# Patient Record
Sex: FEMALE | Race: WHITE | NOT HISPANIC OR LATINO | URBAN - METROPOLITAN AREA
[De-identification: names, ages, dates, MRNs, and addresses within clinical notes are randomized per-mention and may not be internally consistent; named-entity substitution may affect disease eponyms.]

---

## 2024-04-29 ENCOUNTER — OFFICE VISIT (OUTPATIENT)
Dept: FAMILY MEDICINE CLINIC | Facility: HOSPITAL | Age: 34
End: 2024-04-29
Payer: COMMERCIAL

## 2024-04-29 VITALS
SYSTOLIC BLOOD PRESSURE: 110 MMHG | DIASTOLIC BLOOD PRESSURE: 86 MMHG | OXYGEN SATURATION: 97 % | WEIGHT: 293 LBS | HEIGHT: 63 IN | HEART RATE: 81 BPM | BODY MASS INDEX: 51.91 KG/M2

## 2024-04-29 DIAGNOSIS — Z13.1 SCREENING FOR DIABETES MELLITUS: ICD-10-CM

## 2024-04-29 DIAGNOSIS — Z87.898 HISTORY OF WHEEZING: ICD-10-CM

## 2024-04-29 DIAGNOSIS — Z13.29 SCREENING FOR THYROID DISORDER: ICD-10-CM

## 2024-04-29 DIAGNOSIS — Z12.4 SCREENING FOR CERVICAL CANCER: ICD-10-CM

## 2024-04-29 DIAGNOSIS — F33.0 MILD EPISODE OF RECURRENT MAJOR DEPRESSIVE DISORDER (HCC): ICD-10-CM

## 2024-04-29 DIAGNOSIS — E66.01 CLASS 3 SEVERE OBESITY DUE TO EXCESS CALORIES WITHOUT SERIOUS COMORBIDITY WITH BODY MASS INDEX (BMI) OF 60.0 TO 69.9 IN ADULT (HCC): ICD-10-CM

## 2024-04-29 DIAGNOSIS — N83.201 CYST OF RIGHT OVARY: ICD-10-CM

## 2024-04-29 DIAGNOSIS — Z83.49 FAMILY HISTORY OF HYPOTHYROIDISM: ICD-10-CM

## 2024-04-29 DIAGNOSIS — N83.519: ICD-10-CM

## 2024-04-29 DIAGNOSIS — Z13.0 SCREENING FOR IRON DEFICIENCY ANEMIA: Primary | ICD-10-CM

## 2024-04-29 PROCEDURE — 99204 OFFICE O/P NEW MOD 45 MIN: CPT | Performed by: STUDENT IN AN ORGANIZED HEALTH CARE EDUCATION/TRAINING PROGRAM

## 2024-04-29 PROCEDURE — 3725F SCREEN DEPRESSION PERFORMED: CPT | Performed by: STUDENT IN AN ORGANIZED HEALTH CARE EDUCATION/TRAINING PROGRAM

## 2024-04-29 RX ORDER — ALBUTEROL SULFATE 90 UG/1
2 AEROSOL, METERED RESPIRATORY (INHALATION) EVERY 6 HOURS PRN
Qty: 18 G | Refills: 0 | Status: SHIPPED | OUTPATIENT
Start: 2024-04-29

## 2024-04-29 RX ORDER — BUPROPION HYDROCHLORIDE 300 MG/1
300 TABLET ORAL EVERY MORNING
COMMUNITY
Start: 2023-02-28 | End: 2024-04-29 | Stop reason: SDUPTHER

## 2024-04-29 RX ORDER — BUPROPION HYDROCHLORIDE 300 MG/1
300 TABLET ORAL EVERY MORNING
Qty: 90 TABLET | Refills: 0 | Status: SHIPPED | OUTPATIENT
Start: 2024-04-29

## 2024-04-29 NOTE — PATIENT INSTRUCTIONS
Jalil Jimenez     [employer] - HR Tab    Employee Health - get vaccine records.     [facility] order

## 2024-04-29 NOTE — PROGRESS NOTES
[facility]   Rachna Alexander DO    Assessment/Plan:      Diagnosis ICD-10-CM Associated Orders   1. Screening for iron deficiency anemia  Z13.0 CBC and differential     CBC and differential      2. Screening for diabetes mellitus  Z13.1 Comprehensive metabolic panel     Comprehensive metabolic panel      3. Screening for thyroid disorder  Z13.29 TSH, 3rd generation     T4, free     TSH, 3rd generation     T4, free      4. Family history of hypothyroidism  Z83.49 TSH, 3rd generation     T4, free     TSH, 3rd generation     T4, free      5. Cyst of right ovary  N83.201 Ambulatory Referral to Obstetrics / Gynecology      6. Screening for cervical cancer  Z12.4 Ambulatory Referral to Obstetrics / Gynecology      7. Ovarian torsion, acquired  N83.519 Ambulatory Referral to Obstetrics / Gynecology      8. Class 3 severe obesity due to excess calories without serious comorbidity with body mass index (BMI) of 60.0 to 69.9 in adult (HCC)  E66.01 CBC and differential    Z68.44 Comprehensive metabolic panel     Cortisol Level, AM Specimen     CBC and differential     Comprehensive metabolic panel     Cortisol Level, AM Specimen      9. History of wheezing  Z87.898 albuterol (Ventolin HFA) 90 mcg/act inhaler      10. Mild episode of recurrent major depressive disorder (HCC)  F33.0 buPROPion (WELLBUTRIN XL) 300 mg 24 hr tablet        Complete BW this yr.   Patient's medications were reviewed and reconciled.  Ordered/Re-ordered as above.  Estab with GYN locally.   Return in about 3 months (around 7/29/2024) for Annual Physical.  Patient may call or return to office with any questions or concerns.   ______________________________________________________________________  Subjective:     Patient ID: Julia Noonan is a 33 y.o. female.  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   Was traveling nursing.    Was at [facility] until 2014.     Has not had labs recently.     Both parents on synthroid, family obesity.     No prior abnml Paps. Dr. Faiza Strauss of ovarian torsion, surgery 2014, had ovarian cysts, found to only have R ovary.   Was on OCP - sprintec to help with cysts. Self stopped in 2017.   GF - 9 months.     Always heavy, used to have  5d a week, only time helping weight.     Sees therapist, on wellbutrin, and has seen nutritionist & done weight watchers.   Was doing forher on wellbutrin since Feb 2023.     In the past was on lexapro & effexor, self stopped.     [employer] Class 2010.   On license of [facility].      The following portions of the patient's history were reviewed and updated as appropriate: allergies, current medications, past medical history, and problem list.    Review of Systems   Constitutional:  Negative for chills and fever.   Respiratory:  Positive for wheezing (now starting after ice hockey & lasting longer - no asthma hx as a kid). Negative for cough and shortness of breath.    Cardiovascular:  Negative for chest pain and palpitations.   Neurological:  Negative for dizziness, light-headedness and headaches.       Objective:      Vitals:    04/29/24 1204   BP: 110/86   Pulse: 81   SpO2: 97%      Physical Exam  Vitals and nursing note reviewed.   Constitutional:       General: She is not in acute distress.     Appearance: Normal appearance. She is obese. She is not ill-appearing.   HENT:      Head: Normocephalic and atraumatic.   Eyes:      General: No scleral icterus.        Right eye: No discharge.         Left eye: No discharge.   Cardiovascular:      Rate and Rhythm: Normal rate and regular rhythm.      Pulses: Normal pulses.      Heart sounds: Normal heart sounds. No murmur heard.  Pulmonary:      Effort: Pulmonary effort is normal. No respiratory distress.      Breath sounds: Normal breath sounds. No stridor. No wheezing.   Musculoskeletal:      Cervical back: Normal range  "of motion and neck supple. No rigidity.      Right lower leg: No edema.      Left lower leg: No edema.   Neurological:      Mental Status: She is alert and oriented to person, place, and time.      Gait: Gait normal.   Psychiatric:         Mood and Affect: Mood normal.         Behavior: Behavior normal.         Thought Content: Thought content normal.         Judgment: Judgment normal.         Portions of the record may have been created with voice recognition software. Occasional wrong word or \"sound alike\" substitutions may have occurred due to the inherent limitations of voice recognition software. Please review the chart carefully and recognize, using context, where substitutions/typographical errors may have occurred.     "

## 2024-05-14 LAB
ALBUMIN SERPL-MCNC: 4.2 G/DL (ref 3.9–4.9)
ALBUMIN/GLOB SERPL: 1.7 {RATIO} (ref 1.2–2.2)
ALP SERPL-CCNC: 99 IU/L (ref 44–121)
ALT SERPL-CCNC: 15 IU/L (ref 0–32)
AST SERPL-CCNC: 15 IU/L (ref 0–40)
BASOPHILS # BLD AUTO: 0.1 X10E3/UL (ref 0–0.2)
BASOPHILS NFR BLD AUTO: 1 %
BILIRUB SERPL-MCNC: 0.4 MG/DL (ref 0–1.2)
BUN SERPL-MCNC: 11 MG/DL (ref 6–20)
BUN/CREAT SERPL: 14 (ref 9–23)
CALCIUM SERPL-MCNC: 9.1 MG/DL (ref 8.7–10.2)
CHLORIDE SERPL-SCNC: 101 MMOL/L (ref 96–106)
CO2 SERPL-SCNC: 21 MMOL/L (ref 20–29)
CORTIS AM PEAK SERPL-MCNC: 13.8 UG/DL (ref 6.2–19.4)
CREAT SERPL-MCNC: 0.76 MG/DL (ref 0.57–1)
EGFR: 107 ML/MIN/1.73
EOSINOPHIL # BLD AUTO: 0.3 X10E3/UL (ref 0–0.4)
EOSINOPHIL NFR BLD AUTO: 3 %
ERYTHROCYTE [DISTWIDTH] IN BLOOD BY AUTOMATED COUNT: 12.5 % (ref 11.7–15.4)
GLOBULIN SER-MCNC: 2.5 G/DL (ref 1.5–4.5)
GLUCOSE SERPL-MCNC: 95 MG/DL (ref 70–99)
HCT VFR BLD AUTO: 38.4 % (ref 34–46.6)
HGB BLD-MCNC: 12.5 G/DL (ref 11.1–15.9)
IMM GRANULOCYTES # BLD: 0 X10E3/UL (ref 0–0.1)
IMM GRANULOCYTES NFR BLD: 0 %
LYMPHOCYTES # BLD AUTO: 1.9 X10E3/UL (ref 0.7–3.1)
LYMPHOCYTES NFR BLD AUTO: 22 %
MCH RBC QN AUTO: 29.1 PG (ref 26.6–33)
MCHC RBC AUTO-ENTMCNC: 32.6 G/DL (ref 31.5–35.7)
MCV RBC AUTO: 90 FL (ref 79–97)
MONOCYTES # BLD AUTO: 0.6 X10E3/UL (ref 0.1–0.9)
MONOCYTES NFR BLD AUTO: 7 %
NEUTROPHILS # BLD AUTO: 5.9 X10E3/UL (ref 1.4–7)
NEUTROPHILS NFR BLD AUTO: 67 %
PLATELET # BLD AUTO: 345 X10E3/UL (ref 150–450)
POTASSIUM SERPL-SCNC: 4.2 MMOL/L (ref 3.5–5.2)
PROT SERPL-MCNC: 6.7 G/DL (ref 6–8.5)
RBC # BLD AUTO: 4.29 X10E6/UL (ref 3.77–5.28)
SODIUM SERPL-SCNC: 137 MMOL/L (ref 134–144)
T4 FREE SERPL-MCNC: 1.13 NG/DL (ref 0.82–1.77)
TSH SERPL DL<=0.005 MIU/L-ACNC: 3.35 UIU/ML (ref 0.45–4.5)
WBC # BLD AUTO: 8.8 X10E3/UL (ref 3.4–10.8)

## 2024-05-21 DIAGNOSIS — Z87.898 HISTORY OF WHEEZING: ICD-10-CM

## 2024-05-21 RX ORDER — ALBUTEROL SULFATE 90 UG/1
AEROSOL, METERED RESPIRATORY (INHALATION)
Qty: 18 G | Refills: 5 | Status: SHIPPED | OUTPATIENT
Start: 2024-05-21

## 2024-08-02 ENCOUNTER — PATIENT MESSAGE (OUTPATIENT)
Dept: FAMILY MEDICINE CLINIC | Facility: HOSPITAL | Age: 34
End: 2024-08-02

## 2024-08-08 DIAGNOSIS — Z13.220 SCREENING FOR CHOLESTEROL LEVEL: Primary | ICD-10-CM

## 2024-08-08 DIAGNOSIS — Z13.1 SCREENING FOR DIABETES MELLITUS: ICD-10-CM

## 2024-08-12 ENCOUNTER — OFFICE VISIT (OUTPATIENT)
Dept: FAMILY MEDICINE CLINIC | Facility: HOSPITAL | Age: 34
End: 2024-08-12
Payer: COMMERCIAL

## 2024-08-12 ENCOUNTER — TELEPHONE (OUTPATIENT)
Age: 34
End: 2024-08-12

## 2024-08-12 VITALS
BODY MASS INDEX: 51.91 KG/M2 | OXYGEN SATURATION: 97 % | HEART RATE: 98 BPM | HEIGHT: 63 IN | DIASTOLIC BLOOD PRESSURE: 84 MMHG | WEIGHT: 293 LBS | SYSTOLIC BLOOD PRESSURE: 116 MMHG

## 2024-08-12 DIAGNOSIS — Z00.00 ROUTINE ADULT HEALTH MAINTENANCE: Primary | ICD-10-CM

## 2024-08-12 DIAGNOSIS — E66.01 CLASS 3 SEVERE OBESITY DUE TO EXCESS CALORIES WITHOUT SERIOUS COMORBIDITY WITH BODY MASS INDEX (BMI) OF 60.0 TO 69.9 IN ADULT (HCC): ICD-10-CM

## 2024-08-12 DIAGNOSIS — F33.0 MILD EPISODE OF RECURRENT MAJOR DEPRESSIVE DISORDER (HCC): ICD-10-CM

## 2024-08-12 DIAGNOSIS — Z83.49 FAMILY HISTORY OF OBESITY: ICD-10-CM

## 2024-08-12 DIAGNOSIS — Z90.721 OVARY ABSENT: ICD-10-CM

## 2024-08-12 DIAGNOSIS — N83.53: ICD-10-CM

## 2024-08-12 DIAGNOSIS — N83.519: ICD-10-CM

## 2024-08-12 DIAGNOSIS — Z83.49 FAMILY HISTORY OF HYPOTHYROIDISM: ICD-10-CM

## 2024-08-12 PROCEDURE — 99214 OFFICE O/P EST MOD 30 MIN: CPT | Performed by: STUDENT IN AN ORGANIZED HEALTH CARE EDUCATION/TRAINING PROGRAM

## 2024-08-12 PROCEDURE — 99395 PREV VISIT EST AGE 18-39: CPT | Performed by: STUDENT IN AN ORGANIZED HEALTH CARE EDUCATION/TRAINING PROGRAM

## 2024-08-12 RX ORDER — SEMAGLUTIDE 0.25 MG/.5ML
INJECTION, SOLUTION SUBCUTANEOUS
Qty: 2 ML | Refills: 0 | Status: SHIPPED | OUTPATIENT
Start: 2024-08-12

## 2024-08-12 RX ORDER — BUPROPION HYDROCHLORIDE 300 MG/1
300 TABLET ORAL EVERY MORNING
Qty: 90 TABLET | Refills: 3 | Status: SHIPPED | OUTPATIENT
Start: 2024-08-12

## 2024-08-12 NOTE — TELEPHONE ENCOUNTER
PA for Wegovy 0.25 MG/0.5ML SUBMITTED     via    []CMM-KEY   [x][employer]-Case ID # [id number]   []Faxed to plan   []Other website   []Phone call Case ID #     Office notes sent, clinical questions answered. Awaiting determination    Turnaround time for your insurance to make a decision on your Prior Authorization can take 7-21 business days.

## 2024-08-12 NOTE — PROGRESS NOTES
Adult Annual Physical  Name: Julia Noonan      : 1990      MRN: [id number]  Encounter Provider: Rachna Alexander DO  Encounter Date: 2024   Encounter department: [facility] [address]    Assessment & Plan   1. Routine adult health maintenance  2. Torsion of ovary, ovarian pedicle, or fallopian tube  3. Class 3 severe obesity due to excess calories without serious comorbidity with body mass index (BMI) of 60.0 to 69.9 in adult (HCC)  -     Semaglutide-Weight Management (Wegovy) 0.25 MG/0.5ML; Inject 0.25 mg under the skin weekly  4. Family history of hypothyroidism  5. Family history of obesity  -     Semaglutide-Weight Management (Wegovy) 0.25 MG/0.5ML; Inject 0.25 mg under the skin weekly  6. Ovarian torsion, acquired  7. Mild episode of recurrent major depressive disorder (HCC)  -     buPROPion (WELLBUTRIN XL) 300 mg 24 hr tablet; Take 1 tablet (300 mg total) by mouth every morning  8. Ovary absent    Has been on wellbutrin, no weight loss noted.   Consider phentermine if denied by insurance.   Start with wegovy PA for obesity & years of weight gain.   No hx of pancreatitis & no hx of thyroid cancer in pt or family.     Getting new TDAP Monday at Pre-Employment physical.   Immunizations and preventive care screenings were discussed with patient today. Appropriate education was printed on patient's after visit summary.    Counseling:  Alcohol/drug use: discussed moderation in alcohol intake, the recommendations for healthy alcohol use, and avoidance of illicit drug use.  Dental Health: discussed importance of regular tooth brushing, flossing, and dental visits.  Injury prevention: discussed safety/seat belts, safety helmets, smoke detectors, carbon dioxide detectors, and smoking near bedding or upholstery.  Sexual health: discussed sexually transmitted diseases, partner selection, use of condoms, avoidance of unintended pregnancy, and contraceptive alternatives.  Exercise: the  importance of regular exercise/physical activity was discussed. Recommend exercise 3-5 times per week for at least 30 minutes.     Return in about 3 months (around 11/12/2024) for F/U Chronic DX, Med Check.         History of Present Illness     Adult Annual Physical:  Patient presents for annual physical. Did just get labs done   A1c 5.1  Total Chol 171, , HDL 34, LDL 99   Has been very heavy her whole life.   Never on prior weight loss meds. Her whole family is obese also.     Would like to pursue TOPS surgery & will need lower BMI eventually.   Exercises, plays hockey, bikes, but can't lose more than 10 lbs.   Labs all normal in May also for cortisol & TSH/T4..     Diet and Physical Activity:  - Diet/Nutrition:. More home meals, less coffee, will need to up her water,  - Exercise: 3-4 times a week on average. biking & hockey    General Health:  - Sleep: sleeps well and 4-6 hours of sleep on average. 10p-5a  - Hearing: normal hearing bilateral ears.  - Vision: wears contacts and goes for regular eye exams. R eye only  - Dental: brushes teeth twice daily and regular dental visits.    /GYN Health:  - Follows with GYN: no.   - Last menstrual cycle: 7/22/2024.   - History of STDs: no  - Contraception:. Female only partner      Wt Readings from Last 3 Encounters:   08/12/24 (!) 155 kg (342 lb)   04/29/24 (!) 157 kg (347 lb)     Temp Readings from Last 3 Encounters:   No data found for Temp     BP Readings from Last 3 Encounters:   08/12/24 116/84   04/29/24 110/86     Pulse Readings from Last 3 Encounters:   08/12/24 98   04/29/24 81     Review of Systems   Constitutional:  Negative for chills and fever.   Eyes:  Positive for visual disturbance (R eye astigmatism).   Respiratory:  Negative for cough and shortness of breath.    Cardiovascular:  Positive for leg swelling (rare on hot days). Negative for chest pain and palpitations.   Gastrointestinal:  Negative for constipation and diarrhea.   Genitourinary:   "Negative for dysuria and frequency.   Neurological:  Negative for dizziness, light-headedness and headaches.   Psychiatric/Behavioral:  Negative for self-injury, sleep disturbance and suicidal ideas.      Current Outpatient Medications on File Prior to Visit   Medication Sig Dispense Refill   • albuterol (PROVENTIL HFA,VENTOLIN HFA) 90 mcg/act inhaler INHALE 2 PUFFS BY MOUTH EVERY 6 HOURS AS NEEDED FOR WHEEZE OR FOR SHORTNESS OF BREATH 18 g 5   • [DISCONTINUED] buPROPion (WELLBUTRIN XL) 300 mg 24 hr tablet Take 1 tablet (300 mg total) by mouth every morning 90 tablet 0     No current facility-administered medications on file prior to visit.      Social History     Tobacco Use   • Smoking status: Never   • Smokeless tobacco: Never   Vaping Use   • Vaping status: Never Used   Substance and Sexual Activity   • Alcohol use: Not Currently     Alcohol/week: 1.0 standard drink of alcohol     Types: 1 Cans of beer per week     Comment: Socially   • Drug use: Never   • Sexual activity: Yes     Partners: Female     Objective     /84   Pulse 98   Ht 5' 3\" (1.6 m)   Wt (!) 155 kg (342 lb)   SpO2 97%   BMI 60.58 kg/m²     Physical Exam  Vitals reviewed.   Constitutional:       General: She is awake. She is not in acute distress.     Appearance: Normal appearance. She is well-developed. She is morbidly obese. She is not ill-appearing.   HENT:      Head: Normocephalic and atraumatic.      Right Ear: Tympanic membrane, ear canal and external ear normal. There is no impacted cerumen.      Left Ear: Tympanic membrane, ear canal and external ear normal. There is no impacted cerumen.      Nose: Nose normal. No congestion or rhinorrhea.      Mouth/Throat:      Mouth: Mucous membranes are moist.      Pharynx: Oropharynx is clear. No oropharyngeal exudate or posterior oropharyngeal erythema.   Eyes:      General: No scleral icterus.        Right eye: No discharge.         Left eye: No discharge.      Conjunctiva/sclera: " Conjunctivae normal.   Neck:      Comments: No thyroid nodules or thyromegaly.  Cardiovascular:      Rate and Rhythm: Normal rate and regular rhythm.      Pulses: Normal pulses.      Heart sounds: Normal heart sounds. No murmur heard.     No friction rub. No gallop.   Pulmonary:      Effort: Pulmonary effort is normal. No respiratory distress.      Breath sounds: Normal breath sounds. No stridor. No wheezing.   Musculoskeletal:         General: Normal range of motion.      Cervical back: Normal range of motion and neck supple. No rigidity.      Right lower leg: No edema.      Left lower leg: No edema.   Lymphadenopathy:      Cervical: No cervical adenopathy.   Skin:     General: Skin is warm and dry.      Capillary Refill: Capillary refill takes less than 2 seconds.      Coloration: Skin is not jaundiced or pale.   Neurological:      Mental Status: She is alert and oriented to person, place, and time.      Gait: Gait normal.   Psychiatric:         Mood and Affect: Mood normal.         Behavior: Behavior normal. Behavior is cooperative.         Thought Content: Thought content normal.         Judgment: Judgment normal.

## 2024-08-14 NOTE — TELEPHONE ENCOUNTER
PA for Wegovy 0.25mg Approved     Date(s) approved August 12, 2024 to March 12, 2025     Case #: [id number]     Patient advised by          [x] [email or web address] Message  [] Phone call   []LMOM  []L/M to call office as no active Communication consent on file  []Unable to leave detailed message as VM not approved on Communication consent       Pharmacy advised by    [x]Fax  []Phone call    Approval letter scanned into Media No

## 2024-08-15 DIAGNOSIS — E66.01 CLASS 3 SEVERE OBESITY DUE TO EXCESS CALORIES WITHOUT SERIOUS COMORBIDITY WITH BODY MASS INDEX (BMI) OF 60.0 TO 69.9 IN ADULT (HCC): ICD-10-CM

## 2024-08-15 DIAGNOSIS — Z83.49 FAMILY HISTORY OF OBESITY: ICD-10-CM

## 2024-09-13 RX ORDER — SEMAGLUTIDE 0.25 MG/.5ML
INJECTION, SOLUTION SUBCUTANEOUS
Qty: 2 ML | Refills: 0 | Status: SHIPPED | OUTPATIENT
Start: 2024-09-13 | End: 2024-09-16

## 2024-09-16 RX ORDER — SEMAGLUTIDE 0.25 MG/.5ML
INJECTION, SOLUTION SUBCUTANEOUS
Qty: 2 ML | Refills: 0 | Status: SHIPPED | OUTPATIENT
Start: 2024-09-16

## 2024-10-07 DIAGNOSIS — E66.01 CLASS 3 SEVERE OBESITY DUE TO EXCESS CALORIES WITHOUT SERIOUS COMORBIDITY WITH BODY MASS INDEX (BMI) OF 60.0 TO 69.9 IN ADULT (HCC): ICD-10-CM

## 2024-10-07 DIAGNOSIS — E66.813 CLASS 3 SEVERE OBESITY DUE TO EXCESS CALORIES WITHOUT SERIOUS COMORBIDITY WITH BODY MASS INDEX (BMI) OF 60.0 TO 69.9 IN ADULT (HCC): ICD-10-CM

## 2024-10-07 DIAGNOSIS — Z83.49 FAMILY HISTORY OF OBESITY: ICD-10-CM

## 2024-10-08 RX ORDER — SEMAGLUTIDE 0.25 MG/.5ML
INJECTION, SOLUTION SUBCUTANEOUS
Qty: 2 ML | Refills: 0 | Status: SHIPPED | OUTPATIENT
Start: 2024-10-08 | End: 2024-10-10

## 2024-10-10 DIAGNOSIS — E66.01 CLASS 3 SEVERE OBESITY DUE TO EXCESS CALORIES WITHOUT SERIOUS COMORBIDITY WITH BODY MASS INDEX (BMI) OF 60.0 TO 69.9 IN ADULT (HCC): Primary | ICD-10-CM

## 2024-10-10 DIAGNOSIS — Z83.49 FAMILY HISTORY OF OBESITY: ICD-10-CM

## 2024-10-10 DIAGNOSIS — E66.813 CLASS 3 SEVERE OBESITY DUE TO EXCESS CALORIES WITHOUT SERIOUS COMORBIDITY WITH BODY MASS INDEX (BMI) OF 60.0 TO 69.9 IN ADULT (HCC): Primary | ICD-10-CM

## 2024-10-10 RX ORDER — SEMAGLUTIDE 0.5 MG/.5ML
INJECTION, SOLUTION SUBCUTANEOUS
Qty: 2 ML | Refills: 0 | Status: SHIPPED | OUTPATIENT
Start: 2024-10-10

## 2024-10-16 ENCOUNTER — TELEPHONE (OUTPATIENT)
Age: 34
End: 2024-10-16

## 2024-10-16 DIAGNOSIS — E66.813 CLASS 3 SEVERE OBESITY DUE TO EXCESS CALORIES WITHOUT SERIOUS COMORBIDITY WITH BODY MASS INDEX (BMI) OF 60.0 TO 69.9 IN ADULT (HCC): Primary | ICD-10-CM

## 2024-10-16 DIAGNOSIS — E66.01 CLASS 3 SEVERE OBESITY DUE TO EXCESS CALORIES WITHOUT SERIOUS COMORBIDITY WITH BODY MASS INDEX (BMI) OF 60.0 TO 69.9 IN ADULT (HCC): Primary | ICD-10-CM

## 2024-10-16 NOTE — TELEPHONE ENCOUNTER
Patient calling regarding Rx: Wegovy 0.5mg. Pt states medication not available to fill at any pharmacies or 0.25mg inquiring if able to go up a dose or requesting further recommendations.      Please review and advise.  Thank you

## 2024-10-22 RX ORDER — SEMAGLUTIDE 0.25 MG/.5ML
INJECTION, SOLUTION SUBCUTANEOUS
Qty: 2 ML | Refills: 0 | OUTPATIENT
Start: 2024-10-22

## 2024-11-13 ENCOUNTER — OFFICE VISIT (OUTPATIENT)
Dept: FAMILY MEDICINE CLINIC | Facility: HOSPITAL | Age: 34
End: 2024-11-13
Payer: COMMERCIAL

## 2024-11-13 VITALS
HEIGHT: 63 IN | BODY MASS INDEX: 60.58 KG/M2 | SYSTOLIC BLOOD PRESSURE: 124 MMHG | OXYGEN SATURATION: 98 % | HEART RATE: 80 BPM | RESPIRATION RATE: 16 BRPM | DIASTOLIC BLOOD PRESSURE: 82 MMHG

## 2024-11-13 DIAGNOSIS — E66.01 MORBID OBESITY WITH BMI OF 60.0-69.9, ADULT (HCC): ICD-10-CM

## 2024-11-13 DIAGNOSIS — F33.0 MILD EPISODE OF RECURRENT MAJOR DEPRESSIVE DISORDER (HCC): ICD-10-CM

## 2024-11-13 DIAGNOSIS — E66.01 MORBID OBESITY WITH BMI OF 60.0-69.9, ADULT (HCC): Primary | ICD-10-CM

## 2024-11-13 PROCEDURE — 99213 OFFICE O/P EST LOW 20 MIN: CPT | Performed by: NURSE PRACTITIONER

## 2024-11-13 RX ORDER — TIRZEPATIDE 2.5 MG/.5ML
2.5 INJECTION, SOLUTION SUBCUTANEOUS WEEKLY
Qty: 2 ML | Refills: 0 | Status: SHIPPED | OUTPATIENT
Start: 2024-11-13

## 2024-11-13 RX ORDER — TIRZEPATIDE 2.5 MG/.5ML
2.5 INJECTION, SOLUTION SUBCUTANEOUS WEEKLY
Qty: 2 ML | Refills: 0 | Status: SHIPPED | OUTPATIENT
Start: 2024-11-13 | End: 2024-11-13 | Stop reason: SDUPTHER

## 2024-11-13 RX ORDER — TIRZEPATIDE 2.5 MG/.5ML
2.5 INJECTION, SOLUTION SUBCUTANEOUS WEEKLY
Qty: 2 ML | Refills: 0 | Status: SHIPPED | OUTPATIENT
Start: 2024-11-13 | End: 2024-11-13 | Stop reason: ALTCHOICE

## 2024-11-13 RX ORDER — BUPROPION HYDROCHLORIDE 300 MG/1
300 TABLET ORAL EVERY MORNING
Qty: 90 TABLET | Refills: 1 | Status: SHIPPED | OUTPATIENT
Start: 2024-11-13

## 2024-11-13 NOTE — ASSESSMENT & PLAN NOTE
Body mass index is 60.58 kg/m².  Reports she has been overweight her entire life and has strong family history of obesity.  Denies personal or family history of thyroid cancer or pancreatitis.  No chance of pregnancy as she has female partner only.  She remains physically active with biking and ice hockey and optimizing diet without success.  Electrolytes thyroid function as well as a.m. cortisol within normal limits in May 2024.  Agreeable to starting low-dose Mounjaro as well as weight management consult.  Discussed side effects and warning signs with verbalized understanding.      Prior Authorization Clinical Questions for Weight Management Pharmacotherapy    1. Does the patient have a contrainidcation to medication prescribed for weight management?: No  2. Does the patient have a diagnosis of obesity, confirmed by a BMI greater than or equal to 30 kg/m^2?: Yes  3. Does the patient have a BMI of greater than or equal to 27 kg/m^2 with at least one weight-related comorbidity/risk factor/complication (e.g. diabetes, dyslipidemia, coronary artery disease)?: Yes  5. Has the patient been on a weight loss regimen of low-calorie diet, increased physical activity, and lifestyle modifications for a minimum of 6 months?: Yes  6. Has the patient completed a comprehensive weight loss program (ie, Weight Watchers, Noom, Bariatrics, other paddy on phone)? If so, what?: Yes  7. Does the patient have a history of type 2 diabetes?: No  8. Has the member tried and failed other weight loss medication within the past 12 months?: No  9. Will the member use requested medication in combination with another GLP agonist or weight loss drug?: No  10. Is the medication a controlled substance?: No     Baseline weight (in pounds): 342 lbs

## 2024-11-13 NOTE — PROGRESS NOTES
[facility]   Larisa COLLINS    Assessment/Plan:   1. Morbid obesity with BMI of 60.0-69.9, adult ([facility])  Assessment & Plan:  Body mass index is 60.58 kg/m².  Reports she has been overweight her entire life and has strong family history of obesity.  Denies personal or family history of thyroid cancer or pancreatitis.  She remains physically active with biking and ice hockey and optimizing diet without success.  Electrolytes thyroid function as well as a.m. cortisol within normal limits in May 2024.  Agreeable to starting low-dose Mounjaro as well as weight management consult.  Discussed side effects and warning signs with verbalized understanding.      Prior Authorization Clinical Questions for Weight Management Pharmacotherapy    1. Does the patient have a contrainidcation to medication prescribed for weight management?: No  2. Does the patient have a diagnosis of obesity, confirmed by a BMI greater than or equal to 30 kg/m^2?: Yes  3. Does the patient have a BMI of greater than or equal to 27 kg/m^2 with at least one weight-related comorbidity/risk factor/complication (e.g. diabetes, dyslipidemia, coronary artery disease)?: Yes  5. Has the patient been on a weight loss regimen of low-calorie diet, increased physical activity, and lifestyle modifications for a minimum of 6 months?: Yes  6. Has the patient completed a comprehensive weight loss program (ie, Weight Watchers, Noom, Bariatrics, other paddy on phone)? If so, what?: Yes  7. Does the patient have a history of type 2 diabetes?: No  8. Has the member tried and failed other weight loss medication within the past 12 months?: No  9. Will the member use requested medication in combination with another GLP agonist or weight loss drug?: No  10. Is the medication a controlled substance?: No     Baseline weight (in pounds): 342 lbs       Orders:  -     Ambulatory Referral to Weight Management; Future  -     Tirzepatide (Mounjaro) 2.5 MG/0.5ML SOAJ;  Inject 2.5 mg under the skin once a week      No follow-ups on file.  Patient may call or return to office with any questions or concerns.     ______________________________________________________________________  Subjective:     Patient ID: Julia Noonan is a 34 y.o. female.  Julia Noonan  Chief Complaint   Patient presents with    Follow-up     Wegovy follow up not currently using        Was started on wegovy and took it for a month.  Struggled to get refill due to supply thus stopped.  Thought it interacted with her Wellbutrin but would like to try alternative. Continues to be physically active and optimizes diet without weight loss.                  The following portions of the patient's history were reviewed and updated as appropriate: allergies, current medications, past family history, past medical history, past social history, past surgical history, and problem list.    Review of Systems   Constitutional: Negative.  Negative for activity change, appetite change, chills, fatigue, fever and unexpected weight change.   HENT: Negative.  Negative for congestion, ear pain, postnasal drip and sinus pain.    Eyes: Negative.    Respiratory: Negative.  Negative for cough, chest tightness, shortness of breath and wheezing.    Cardiovascular: Negative.  Negative for chest pain, palpitations and leg swelling.   Gastrointestinal: Negative.  Negative for abdominal pain, constipation and diarrhea.   Endocrine: Negative.    Genitourinary: Negative.  Negative for difficulty urinating, dysuria and menstrual problem.   Musculoskeletal: Negative.  Negative for gait problem.   Skin: Negative.    Allergic/Immunologic: Negative.  Negative for immunocompromised state.   Neurological: Negative.  Negative for dizziness and light-headedness.   Hematological: Negative.    Psychiatric/Behavioral: Negative.  Negative for sleep disturbance.          Objective:      Vitals:    11/13/24 0730   BP: 124/82   Pulse: 80   Resp: 16   SpO2:  "98%      Physical Exam  Vitals and nursing note reviewed.   Constitutional:       General: She is awake.      Appearance: Normal appearance. She is morbidly obese.   HENT:      Head: Normocephalic and atraumatic.      Right Ear: Tympanic membrane, ear canal and external ear normal.      Left Ear: Tympanic membrane, ear canal and external ear normal.      Nose: Nose normal.      Mouth/Throat:      Mouth: Mucous membranes are moist.      Pharynx: Oropharynx is clear.   Eyes:      Extraocular Movements: Extraocular movements intact.      Conjunctiva/sclera: Conjunctivae normal.      Pupils: Pupils are equal, round, and reactive to light.   Cardiovascular:      Rate and Rhythm: Normal rate and regular rhythm.      Pulses: Normal pulses.      Heart sounds: Normal heart sounds.   Pulmonary:      Effort: Pulmonary effort is normal.      Breath sounds: Normal breath sounds.   Abdominal:      General: Bowel sounds are normal.      Palpations: Abdomen is soft.   Musculoskeletal:         General: Normal range of motion.      Cervical back: Normal range of motion and neck supple.      Right lower leg: No edema.      Left lower leg: No edema.      Comments: Compression stockings in place.    Skin:     General: Skin is warm and dry.   Neurological:      General: No focal deficit present.      Mental Status: She is alert and oriented to person, place, and time.   Psychiatric:         Mood and Affect: Mood normal.         Behavior: Behavior normal. Behavior is cooperative.         Thought Content: Thought content normal.         Judgment: Judgment normal.           Portions of the record may have been created with voice recognition software. Occasional wrong word or \"sound alike\" substitutions may have occurred due to the inherent limitations of voice recognition software. Please review the chart carefully and recognize, using context, where substitutions/typographical errors may have occurred.       "

## 2024-11-13 NOTE — TELEPHONE ENCOUNTER
Transmission to pharmacy error occurred. Medication resent to pharmacy.     Receipt confirmed by pharmacy.   E-Prescribing Status: Receipt confirmed by pharmacy (11/13/2024  9:27 AM EST)

## 2024-11-29 NOTE — PROGRESS NOTES
"Weight Management Medical Nutrition Assessment  Julia is here for medical meal planning. Current wt: 334.8 lbs. Mounjaro ordered last month by her PCP however is not diabetic so that was not approved. Had been on wegovy but had trouble obtaining and feels it interacted with wellbutrin. Reports she has been overweight her whole life. Was on her first diet in 3rd grade. She is currently on wellbutrin. Recommend visit with provider to determine if additional medications would be appropriate. Food recall reveals inadequate intake and therefore decreased protein intake. Benefits of interval eating reviewed. Hydration inadequate with large caffeine consumption. Reports she has been working on decreasing caffeine. Discussed fluid needs. Meal plan and other resources provided. Options for f/u discussed. She will f/u in 7 wks.     Patient seen by Medical Provider in past 6 months:  no  Requested to schedule appointment with Medical Provider: Yes    Anthropometric Measurements  Start Weight (#) & Date: 334.8 lbs 12/6/24  Current Weight (#): 334.8 lbs   TBW % Change from start weight:-  Ideal Body Weight (#):125 lbs 65\" (BMI 25 150.1 lbs )    Goal Weight (#):no specific   Highest: 350 lbs (18 months ago)  Lowest: 315 lbs    Weight Loss History  Previous weight loss attempts: Commercial Programs (Weight Watchers, Gita Buchanan, etc.)  Exercise  Fasting  Self Created Diets (Portion Control, Healthy Food Choices, etc.)  wegovy    Food and Nutrition Related History  Wake up: 5:55 a.m.   Bed Time:9:30-10    Food Recall  Breakfast:6:30: egg muffins (3  total eggs, veggies), coffee w/sugar free creamer     Snack:skip  Lunch:12-12:30: apple, 2-3 tbsp pb OR siggis yogurt, hint water OR hummus, veggies or dwayne chips OR ~4 oz chicken, siggis  Snack:skip OR protein smoothie   Dinner:M/W 6:00, T/TH 9-9:30: ~4-6 oz chicken. 3/4 cup veggies w/balsamic glaze, ~1/2 cup rice   Snack:skip     Beverages: water, coffee  Volume of beverage intake: 30 " oz coffee, 32-48 oz water    Weekends: Worse, would skip breakfast and eat out more  Cravings: sometimes sweets but doesn't indulge those cravings   Trouble area of day:between 5-7 p.m.    Frequency of Eating out: 2x/wk  Food restrictions:n/a  Cooking: self   Food Shopping: self    Physical Activity Intake  Activity:ice hockey, biking (only in summer), walking  Frequency:weekly but might be inconsistent   Physical limitations/barriers to exercise: n/a    Estimated Needs  Energy  SECA: BMR:n/a      X 1.3 -1000 =  Adarsh Magana Energy Needs: BMR : 2230   1-2# loss weekly sedentary:  8085-9091             1-2# loss weekly lightly active:2931-8143  Maintenance calories for sedentary activity level: 2675  Protein:68-85 gm      (1.2-1.5g/kg IBW)  Fluid: 94 oz     (35mL/kg adjusted body weight)    Nutrition Diagnosis  Yes;    Overweight/obesity  related to Excess energy intake as evidenced by  BMI more than normative standard for age and sex (obesity-grade III 40+)       Nutrition Intervention    Nutrition Prescription  Calories:1948-0562  Protein: gm    Meal Plan (Fabiano/Pro)  Breakfast: 350, 20  Snack: 150-200, 10-15  Lunch: 450, 20  Snack: 150-200, 10-15  Dinner: 400-500, 30-40  Snack: skip    Nutrition Education:    Calorie controlled menu  Lean protein food choices  Healthy snack options  Food journaling tips    Nutrition Counseling:  Strategies: meal planning, portion sizes, healthy snack choices, hydration, fiber intake, protein intake, exercise, food journal    Monitoring and Evaluation:  Evaluation criteria:  Energy Intake  Meet protein needs  Maintain adequate hydration  Monitor weekly weight  Meal planning/preparation  Food journal   Decreased portions at mealtimes and snacks  Physical activity     Barriers to learning:none  Readiness to change: Preparation:  (Getting ready to change)   Comprehension: very good  Expected Compliance: very good

## 2024-12-06 ENCOUNTER — CLINICAL SUPPORT (OUTPATIENT)
Dept: BARIATRICS | Facility: CLINIC | Age: 34
End: 2024-12-06
Payer: COMMERCIAL

## 2024-12-06 VITALS — WEIGHT: 293 LBS | BODY MASS INDEX: 48.82 KG/M2 | HEIGHT: 65 IN

## 2024-12-06 DIAGNOSIS — R63.5 ABNORMAL WEIGHT GAIN: Primary | ICD-10-CM

## 2024-12-06 DIAGNOSIS — E66.01 MORBID OBESITY WITH BMI OF 60.0-69.9, ADULT (HCC): ICD-10-CM

## 2024-12-06 PROCEDURE — RECHECK

## 2024-12-06 PROCEDURE — WMDI30

## 2024-12-09 ENCOUNTER — TELEPHONE (OUTPATIENT)
Dept: BARIATRICS | Facility: CLINIC | Age: 34
End: 2024-12-09

## 2024-12-09 NOTE — TELEPHONE ENCOUNTER
Called patient and left a message to give the office a call back to schedule a NP appointment with Non- Surgical per patient request per Nellie Miller RD.

## 2024-12-30 ENCOUNTER — TELEPHONE (OUTPATIENT)
Dept: BARIATRICS | Facility: CLINIC | Age: 34
End: 2024-12-30

## 2025-02-13 NOTE — PROGRESS NOTES
Assessment & Plan  Class 3 obesity    Current weight: 346    Prescribed  Zepbound 2.5    Patient has been overweight for many years. Patient has tried to maintain healthy dietary changes which include portion control for over 1 year.  She has completed comprehensive weight loss programs in the past.  She has tried Wegovy in the past but was concerned about side effects which I believe was secondary to significant calorie restriction given that she was not having much of appetite.  Recommend to start off with Zepbound in conjunction with her lifestyle modifications as I listed below    Does the patient have a BMI of greater than or equal to 27 kg/m^2 with at least one weight-related comorbidity/risk factor/complication (e.g. diabetes, dyslipidemia, coronary artery disease)?: Yes     Has the patient been on a weight loss regimen of low-calorie diet, increased physical activity, and lifestyle modifications for a minimum of 6 months?: Yes    Has the patient completed a comprehensive weight loss program (ie, Weight Watchers, Noom, Bariatrics, other paddy on phone)? If so, what?: Yes      Patient has a BMI greater than 58 associated with co-morbidities such as  anxiety and depression, asthma    Encouraged adequate amount of fluids and protein throughout the day to help promote weight loss. Recommend practicing with frequent meals/snacks (3 meals 2-3 snacks daily) as it can potentially improve metabolism and allow for better portion control by decreasing Ghrelin (hunger hormone)     Mindful Eating and Drinking is necessary to promote healthy behaviors that can lead to decreased adipose tissue which can be curative and preventative for comorbidities such as hypertension, diabetes, anxiety, depression, osteoarthritis, dyslipidemia, asthma, liver disease, cardiovascular disease, stroke, sleep apnea and cancers.      Fluid intake which is at least half your body weight in ounces is necessary to help control cravings  (decreasing confusion for appetite vs water deprivation) as the human body is made up of 50-70% of Fluids. If there is a diversion for water alone, would recommend flavored water (example-splash of lemonade or ice tea) to help promote compliance. Fluids include Teas, water, flavored water, seltzer water, coffee, shakes     Protein is necessary to promote satiety, metabolism, and muscle growth/repair. Increased energy expenditure is used for the processes of breaking down and rebuilding proteins within the body when eating meals that are protein based      Carbohydrates are essential as it is the body's main fuel source for daily activities.  Recommend that carbohydrates be consumed mostly during the times you are more active during the day      Fats: Essential vitamins like A, D, and E, protect your organs, support cell growth, and contribute to maintaining healthy cholesterol levels      Metabolism:  Metabolism can also be promoted by nutrition, meal frequency and increased muscle mass     Daily Calorie Needs: are individualized and will need to take into account any fluid losses, increased activity levels which may need to be adjusted daily. Recommended to not skip any meals even if there is a lack of appetite as it can be suppressed by caffeine or any prior heavy meal due to Leptin (satiety hormone).  Calorie and fluid intake will need to be increased if there is any increased activity during the day compared to previous days.  With proper fluid and macronutrient intake throughout the day, portion control and decreased cravings will improve     Weight check: BMI and weight serve as only guidelines as it is not factor in muscle mass, fat, water. Weights can fluctuate depending on fluid shifts vs what foods are consumed prior to checking your weight.      Return in about 5 weeks (around 3/21/2025) for followup .     Most recent notes, labs and previous medical records were reviewed.      "  ______________________________________________________________________        Subjective:     Chief Complaint   Patient presents with    Consult     MWM Consult; Waist-58in ; SB-3/8       HPI:    35-year-old female past medical history of anxiety and depression, class III obesity, cholecystectomy presents to clinic for consultation for weight management. Tried to lose weight since 5 years ago.     Weight Loss History  Previous weight loss attempts: Commercial Programs (Weight Watchers [employer] weight watchers )  Previous medications: Wegovy  Self Created Diets (Portion Control, Healthy Food Choices, etc.)    Dietary regimen: (12/2024 completed with RD)  Breakfast: coffee, Oats   Lunch: Grilled chicken breast, rice . Apple peanut butter   Snack:protein smoothie   Dinner: Meat starch vegs        Beverages: water, coffee  Fluid intake: 30 oz coffee, 32oz x 3, 2 coffee   Cravings: sometimes sweets     Frequency of Eating out: 2x/wk  Food restrictions:n/a  Cooking: self   Food Shopping: self  Np Practioner: Gerard    Physical Activity Intake  Activity:ice hockey, biking (only in summer), walking  Frequency:weekly but might be inconsistent     General: No pallor, no weakness   Pulmonary: Negative for shortness of breath  Chest: negative for chest pain  Gastrointestinal:  Negative for abdominal pain, diarrhea   Psychiatric/Behavioral:  Negative for behavioral problems, confusion, dysphoric mood and hallucinations.    All other systems reviewed and are negative.     Objective:  /94   Pulse (!) 109   Temp 97.8 °F (36.6 °C)   Resp 16   Ht 5' 4.5\" (1.638 m)   Wt (!) 157 kg (346 lb)   BMI 58.47 kg/m²     Wt Readings from Last 30 Encounters:   02/14/25 (!) 157 kg (346 lb)   12/06/24 (!) 152 kg (334 lb 12.8 oz)   08/12/24 (!) 155 kg (342 lb)   04/29/24 (!) 157 kg (347 lb)       Physical Exam  Constitutional:       General: No acute distress.  Well-nourished  HENT:      Head: Normocephalic and atraumatic.   Eyes: "      Extraocular Movements: Extraocular movements intact.      Conjunctiva/pupils: Conjunctivae normal. Pupils are equal, round  Pulmonary:      Effort: Pulmonary effort is normal. No labored breathing   Neurological:      General: No focal deficit present.  AO x 3     Mental Status: Alert and oriented to person, place, and time. Mental status is at baseline.   Psychiatric:         Mood and Affect: Mood normal.         Behavior: Behavior normal.     Labs and Imaging  Recent labs and imaging have been personally reviewed.

## 2025-02-14 ENCOUNTER — OFFICE VISIT (OUTPATIENT)
Dept: BARIATRICS | Facility: CLINIC | Age: 35
End: 2025-02-14
Payer: COMMERCIAL

## 2025-02-14 VITALS
RESPIRATION RATE: 16 BRPM | BODY MASS INDEX: 48.82 KG/M2 | HEART RATE: 109 BPM | DIASTOLIC BLOOD PRESSURE: 94 MMHG | SYSTOLIC BLOOD PRESSURE: 130 MMHG | TEMPERATURE: 97.8 F | WEIGHT: 293 LBS | HEIGHT: 65 IN

## 2025-02-14 DIAGNOSIS — E66.813 CLASS 3 SEVERE OBESITY DUE TO EXCESS CALORIES WITH BODY MASS INDEX (BMI) OF 40.0 TO 44.9 IN ADULT (HCC): Primary | ICD-10-CM

## 2025-02-14 DIAGNOSIS — E66.01 CLASS 3 SEVERE OBESITY DUE TO EXCESS CALORIES WITH BODY MASS INDEX (BMI) OF 40.0 TO 44.9 IN ADULT (HCC): Primary | ICD-10-CM

## 2025-02-14 PROCEDURE — 99204 OFFICE O/P NEW MOD 45 MIN: CPT | Performed by: STUDENT IN AN ORGANIZED HEALTH CARE EDUCATION/TRAINING PROGRAM

## 2025-02-14 RX ORDER — TIRZEPATIDE 2.5 MG/.5ML
2.5 INJECTION, SOLUTION SUBCUTANEOUS WEEKLY
Qty: 2 ML | Refills: 0 | Status: SHIPPED | OUTPATIENT
Start: 2025-02-14 | End: 2025-03-14

## 2025-02-25 ENCOUNTER — TELEPHONE (OUTPATIENT)
Dept: BARIATRICS | Facility: CLINIC | Age: 35
End: 2025-02-25

## 2025-02-25 NOTE — TELEPHONE ENCOUNTER
PA for Zepbound 2.5mgSUBMITTED to [employer]    via    []CMM-KEY: [id number]  []Surescripts-Case ID #   []Availity-Auth ID # NDC #   []Faxed to plan   []Other website   []Phone call Case ID #     []PA sent as URGENT    All office notes, labs and other pertaining documents and studies sent. Clinical questions answered. Awaiting determination from insurance company.     Turnaround time for your insurance to make a decision on your Prior Authorization can take 7-21 business days.

## 2025-02-26 NOTE — TELEPHONE ENCOUNTER
PA for zepbound 2.5mg DENIED    Reason:(Screenshot if applicable)    Plan Exclusion       Message sent to office clinical pool Yes    Denial letter scanned into Media Yes    Appeal started No (Provider will need to decide if appeal is warranted and send clinical documentation to Prior Authorization Team for initiation.)    **Please follow up with your patient regarding denial and next steps**

## 2025-03-17 DIAGNOSIS — E66.813 CLASS 3 SEVERE OBESITY DUE TO EXCESS CALORIES WITH BODY MASS INDEX (BMI) OF 40.0 TO 44.9 IN ADULT (HCC): Primary | ICD-10-CM

## 2025-03-17 DIAGNOSIS — E66.01 CLASS 3 SEVERE OBESITY DUE TO EXCESS CALORIES WITH BODY MASS INDEX (BMI) OF 40.0 TO 44.9 IN ADULT (HCC): Primary | ICD-10-CM

## 2025-03-17 RX ORDER — TIRZEPATIDE 2.5 MG/.5ML
2.5 INJECTION, SOLUTION SUBCUTANEOUS WEEKLY
Qty: 2 ML | Refills: 5 | Status: SHIPPED | OUTPATIENT
Start: 2025-03-17 | End: 2025-03-17 | Stop reason: SDUPTHER

## 2025-03-17 RX ORDER — TIRZEPATIDE 2.5 MG/.5ML
2.5 INJECTION, SOLUTION SUBCUTANEOUS WEEKLY
Qty: 2 ML | Refills: 5 | Status: SHIPPED | OUTPATIENT
Start: 2025-03-17

## 2025-03-21 ENCOUNTER — CONSULT (OUTPATIENT)
Dept: FAMILY MEDICINE CLINIC | Facility: HOSPITAL | Age: 35
End: 2025-03-21
Payer: COMMERCIAL

## 2025-03-21 VITALS
DIASTOLIC BLOOD PRESSURE: 90 MMHG | OXYGEN SATURATION: 97 % | WEIGHT: 293 LBS | SYSTOLIC BLOOD PRESSURE: 132 MMHG | HEART RATE: 91 BPM | HEIGHT: 65 IN | BODY MASS INDEX: 48.82 KG/M2 | TEMPERATURE: 98.1 F

## 2025-03-21 DIAGNOSIS — Z01.818 PREOP EXAMINATION: Primary | ICD-10-CM

## 2025-03-21 DIAGNOSIS — F64.9 GENDER DYSPHORIA: ICD-10-CM

## 2025-03-21 PROCEDURE — 99213 OFFICE O/P EST LOW 20 MIN: CPT

## 2025-03-21 NOTE — PROGRESS NOTES
"Name: Julia Noonan      : 1990      MRN: [id number]  Encounter Provider: Jacinta Garnett DO  Encounter Date: 3/21/2025   Encounter department: [facility] CARE [address]  :  Assessment & Plan  Preop examination  - Pt will undergo an elective Intermediate Risk surgery on 25. TOP SURGERY INVLUSIVE OF B/L SUBCUTANEOUS MASTECTOMY W/ REMOVAL OF B/L NIPPLES & RECONSTRUCTION With Dr Gilles PATIÑO. She is RCRI class 0 risk  with 3.9% 30-day risk of death, MI, or cardiac arrest.     Plan:  - Pt optimized for procedure, may proceed  - Continue to hold Zepbound & NSAIDs         Gender dysphoria                History of Present Illness   Patient does not take any blood thinners, but is presceibed zepbound, has not started and does not plan to start until after operation, no current regular NSAID use.  No history of stroke or MI.  No issues with anesthesia in the past.  No history of type 2 diabetes on insulin.  Lab work.  No other questions or concerns at this time.    Tobacco - denies history  Alcohol - 2x/year  Illicit Drugs - denies        Review of Systems   All other systems reviewed and are negative.      Objective   /90   Pulse 91   Temp 98.1 °F (36.7 °C) (Tympanic)   Ht 5' 4.5\" (1.638 m)   Wt (!) 159 kg (350 lb 6.4 oz)   SpO2 97%   BMI 59.22 kg/m²      Physical Exam  Vitals reviewed.   Constitutional:       General: She is not in acute distress.     Appearance: Normal appearance. She is well-developed. She is obese. She is not ill-appearing.   HENT:      Head: Normocephalic and atraumatic.      Right Ear: Tympanic membrane, ear canal and external ear normal. There is no impacted cerumen.      Left Ear: Tympanic membrane, ear canal and external ear normal. There is no impacted cerumen.      Mouth/Throat:      Mouth: Mucous membranes are moist.   Eyes:      Conjunctiva/sclera: Conjunctivae normal.   Cardiovascular:      Rate and Rhythm: Normal rate and regular rhythm.     "  Heart sounds: Normal heart sounds.   Pulmonary:      Effort: Pulmonary effort is normal.      Breath sounds: Normal breath sounds.   Abdominal:      General: Bowel sounds are normal.      Palpations: Abdomen is soft.      Tenderness: There is no abdominal tenderness. There is no guarding or rebound.   Musculoskeletal:         General: Normal range of motion.   Skin:     General: Skin is warm and dry.   Neurological:      Mental Status: She is alert.      Gait: Gait normal.   Psychiatric:         Mood and Affect: Mood normal.         Behavior: Behavior normal.         Jacinta Garnett, DO  Family Medicine

## 2025-05-16 DIAGNOSIS — F33.0 MILD EPISODE OF RECURRENT MAJOR DEPRESSIVE DISORDER (HCC): ICD-10-CM

## 2025-05-16 RX ORDER — BUPROPION HYDROCHLORIDE 300 MG/1
300 TABLET ORAL EVERY MORNING
Qty: 90 TABLET | Refills: 1 | Status: SHIPPED | OUTPATIENT
Start: 2025-05-16

## 2025-06-06 ENCOUNTER — OFFICE VISIT (OUTPATIENT)
Dept: BARIATRICS | Facility: CLINIC | Age: 35
End: 2025-06-06
Payer: COMMERCIAL

## 2025-06-06 VITALS
WEIGHT: 293 LBS | TEMPERATURE: 98.7 F | HEART RATE: 95 BPM | BODY MASS INDEX: 48.82 KG/M2 | RESPIRATION RATE: 16 BRPM | DIASTOLIC BLOOD PRESSURE: 82 MMHG | SYSTOLIC BLOOD PRESSURE: 140 MMHG | HEIGHT: 65 IN

## 2025-06-06 DIAGNOSIS — E66.813 CLASS 3 SEVERE OBESITY WITH SERIOUS COMORBIDITY AND BODY MASS INDEX (BMI) OF 40.0 TO 44.9 IN ADULT, UNSPECIFIED OBESITY TYPE: Primary | ICD-10-CM

## 2025-06-06 PROCEDURE — 99214 OFFICE O/P EST MOD 30 MIN: CPT | Performed by: STUDENT IN AN ORGANIZED HEALTH CARE EDUCATION/TRAINING PROGRAM

## 2025-06-06 RX ORDER — TIRZEPATIDE 5 MG/.5ML
5 INJECTION, SOLUTION SUBCUTANEOUS WEEKLY
Qty: 2 ML | Refills: 4 | Status: SHIPPED | OUTPATIENT
Start: 2025-06-06

## 2025-06-06 NOTE — PROGRESS NOTES
Assessment & Plan  Class 3 Obesity    Initial weight: 346  Current weight: 334    TBW loss%:6     Medication: zepbound 2.5, increase to 5    Patient understands the importance of making lifestyle changes as recommended below to aid in weight loss.      Dietary Recommendations:  Recommend to avoid skipping any meals. Adequate amount of Macronutrients (minimal 3 meals a day) is necessary to help improve metabolism, satiety and allow for better portion control by decreasing Ghrelin (hunger hormone). Lack of hunger can be suppressed by a hormone called Leptin (full hormone) which can occur from a previous meal or caffeine intake    Protein intake throughout the day can help promote satiety and is necessary for muscle growth/repair    Carbohydrates are essential as it is the vital source of fuel for daily activities. energy, cell function, nutrient absorption, and hormone production.     Fats: Essential vitamins like A, D, and E, support cell growth, function and are necessary for nutrient absorption to support your organs     Fluid intake which is at least half your body weight in ounces is necessary to help control cravings (decreasing confusion for appetite vs water deprivation) as the human body is made up of 50-70% of Fluids. If there is a diversion for water alone, would recommend flavored water (example-splash of lemonade or ice tea) to help promote compliance. Fluids include Teas, water, flavored water, seltzer water, coffee, shakes    Metabolism:    Metabolism can also be promoted by macronutrient intake and increased muscle weight via thermogenesis      Daily Calorie Needs: Recommend to take into account any fluid losses and calories burned via increased activity levels as daily calories may need to be adjusted     Weight check: Weights can fluctuate depending on fluid shifts vs what foods are consumed prior to checking your weight          Return in about 4 months (around 9/23/2025) for followup .     Most  "recent notes, labs and previous medical records were reviewed. Total time with chart review and with the patient: 35 min      ______________________________________________________________________        Subjective:     Chief Complaint   Patient presents with    Follow-up     Mwm f/u 5w: waist: 54in       HPI: 34 y.o. female with pmh of  anxiety and depression, class III obesity, cholecystectomy presents to clinic for consultation for weight management. Tried to lose weight since 5 years ago.      Weight Loss History  Previous weight loss attempts: Commercial Programs (Weight Watchers [employer] weight watchers )  Previous medications: Wegovy  Self Created Diets (Portion Control, Healthy Food Choices, etc.)  -zepbound denied     Dietary Regimen:  Breakfast: coffee, Oats   Lunch: Grilled chicken breast, rice . Apple peanut butter   Snack:protein smoothie   Dinner: Meat starch vegs   Beverages: water, coffee  Fluid intake: 30 oz coffee, 32oz x 3  Cravings: sometimes sweets       Review Of Systems:  General: No pallor  Pulmonary: Negative for shortness of breath  Chest: negative for chest pain  Gastrointestinal:  Negative for vomiting   Psychiatric/Behavioral:  Negative for behavioral problems, confusion, dysphoric mood and hallucinations.    All other systems reviewed and are negative.     Objective:  /82   Pulse 95   Temp 98.7 °F (37.1 °C)   Resp 16   Ht 5' 4.5\" (1.638 m)   Wt (!) 152 kg (334 lb 3.2 oz)   LMP 05/27/2025 (Exact Date)   BMI 56.48 kg/m²     Wt Readings from Last 30 Encounters:   06/06/25 (!) 152 kg (334 lb 3.2 oz)   03/21/25 (!) 159 kg (350 lb 6.4 oz)   02/14/25 (!) 157 kg (346 lb)   12/06/24 (!) 152 kg (334 lb 12.8 oz)   08/12/24 (!) 155 kg (342 lb)   04/29/24 (!) 157 kg (347 lb)       Physical Exam  Constitutional:       General: No acute distress.  Well-nourished  HENT:      Head: Normocephalic and atraumatic.   Eyes:      Extraocular Movements: Extraocular movements intact.      " Conjunctiva/pupils: Conjunctivae normal. Pupils are equal, round  Pulmonary:      Effort: Pulmonary effort is normal. No labored breathing   Neurological:      General: No focal deficit present.  AO x 3     Mental Status: Alert and oriented to person, place, and time. Mental status is at baseline.   Psychiatric:         Mood and Affect: Mood normal.         Behavior: Behavior normal.     Labs and Imaging  Recent labs and imaging have been personally reviewed.